# Patient Record
(demographics unavailable — no encounter records)

---

## 2025-01-13 NOTE — REASON FOR VISIT
[Follow - Up] : a follow-up visit [Weight Management/Obesity] : weight management/obesity [Parent] : parent

## 2025-01-13 NOTE — HISTORY OF PRESENT ILLNESS
[FreeTextEntry1] : 25 year old male patient who present for follow up evaluation of abnormal weight gain    - patient reports excessive weight gain in 2018 ( when mom was diagnosed with breast cancer ) and then was able to loose  and over the past one and half year he gained almost 100 lbs despite watching what he eats and following a low carb diet . he is not very active at work . but can go for walks . - he reports problems maintaining his sleep and also snore, likely has sleep apnea but did not get testing before  - occasional muscle aches, no bruising problems  - to note that patient was diagnosed as a child with autism , during visit with help of mother was interactive and answering all questions with no problems  - he denies taking any medications or supplements except zyrtec PRN   3/2023: had dex suppression test with adequate suppression  since last visit he lost weight feels ok , labs with A1c 5.7% glucose ok on CMP , pituitary work up ok  8/2023: since last visit , feels ok , remain on metformin 500 mg daily no SE , A1c 5.7%   -6/2024: patient present for follow up , he remain on metformin 1000 mg BID , tolerating it well, weight same A1c down  to 5.5%   1/2025: remain on metformin 1000 mg BID , A1c 5.8% , despite restricting diet and increase activity weight still up

## 2025-01-13 NOTE — REVIEW OF SYSTEMS
[Fatigue] : fatigue [As Noted in HPI] : as noted in HPI [Recent Weight Gain (___ Lbs)] : recent weight gain: [unfilled] lbs [Recent Weight Loss (___ Lbs)] : no recent weight loss [Blurred Vision] : no blurred vision [Dysphagia] : no dysphagia [Neck Pain] : no neck pain [Dysphonia] : no dysphonia [Chest Pain] : no chest pain [Palpitations] : no palpitations [Shortness Of Breath] : no shortness of breath [SOB on Exertion] : no shortness of breath on exertion [Nausea] : no nausea [Constipation] : no constipation [Vomiting] : no vomiting [Diarrhea] : no diarrhea [Muscle Weakness] : no muscle weakness [Acanthosis] : no acanthosis  [Acne] : no acne [Dry Skin] : no dry skin [Hair Loss] : no hair loss [Headaches] : no headaches [Tremors] : no tremors [Pain/Numbness of Digits] : no pain/numbness of digits [Cold Intolerance] : no cold intolerance [Heat Intolerance] : no heat intolerance

## 2025-01-13 NOTE — PHYSICAL EXAM
[Alert] : alert [Obese] : obese [No Acute Distress] : no acute distress [No Proptosis] : no proptosis [No Lid Lag] : no lid lag [Thyroid Not Enlarged] : the thyroid was not enlarged [No Thyroid Nodules] : no palpable thyroid nodules [No Respiratory Distress] : no respiratory distress [No Accessory Muscle Use] : no accessory muscle use [Clear to Auscultation] : lungs were clear to auscultation bilaterally [Normal S1, S2] : normal S1 and S2 [No Murmurs] : no murmurs [Regular Rhythm] : with a regular rhythm [No Edema] : no peripheral edema [Soft] : abdomen soft [Abdominal Striae] : abdominal striae present [No Tremors] : no tremors [Oriented x3] : oriented to person, place, and time [Acanthosis Nigricans] : no acanthosis nigricans [Acne] : no acne [de-identified] :  , abnormal stretch marks over abdomen side, supraclavicular fat pads

## 2025-01-13 NOTE — ASSESSMENT
[Importance of Diet and Exercise] : importance of diet and exercise to improve glycemic control, achieve weight loss and improve cardiovascular health [Exercise/Effect on Glucose] : exercise/effect on glucose [Weight Loss] : weight loss [FreeTextEntry1] : 25 year old male patient who present for follow up evaluation of abnormal weight gain/ preDM   # preDM / BMI 42 - patient reports excessive weight gain in 2018 ( when mom was diagnosed with breast cancer ) and then was able to loose  and over the past one and half year he gained almost 100 lbs despite watching what he eats and following a low carb diet . he is not very active at work . but can go for walks . - he reports problems maintaining his sleep and also snore, likely has sleep apnea but did not get testing before  - occasional muscle aches, no bruising problems  - to note that patient was diagnosed as a child with autism , during visit with help of mother was interactive and answering all questions with no problems  - he denies taking any medications or supplements  - tft's A1c and lipids  normal in 8/2022 , only high ALT ? fatty liver  # screen for [place] with dex suppression test was negative despite PE findings, will monitor and consider rechecking dex suppression or 24 hour urine cortisol if remain high suspicion  # preDM A1c 5.5%  on  metformin 1000 mg BID tolerating it well , continue  - 1/2025: A1c 5.8% despite metformin 1000 mg BID , gained weight , affecting his breathing, despite strict diet and also exercising, mom confirmed no FH of MEN syndrome , also having possible INOCENCIA  will try wegovy 0.25 mg Q week  and increase to 0.5 mg Q week and monitor , reviewed possible side effects and also injection technique

## 2025-07-14 NOTE — HISTORY OF PRESENT ILLNESS
[FreeTextEntry1] : 26 year old male patient who present for follow up evaluation of abnormal weight gain    - patient reports excessive weight gain in 2018 ( when mom was diagnosed with breast cancer ) and then was able to loose  and over the past one and half year he gained almost 100 lbs despite watching what he eats and following a low carb diet . he is not very active at work . but can go for walks . - he reports problems maintaining his sleep and also snore, likely has sleep apnea but did not get testing before  - occasional muscle aches, no bruising problems  - to note that patient was diagnosed as a child with autism , during visit with help of mother was interactive and answering all questions with no problems  - he denies taking any medications or supplements except zyrtec PRN   3/2023: had dex suppression test with adequate suppression  since last visit he lost weight feels ok , labs with A1c 5.7% glucose ok on CMP , pituitary work up ok  8/2023: since last visit , feels ok , remain on metformin 500 mg daily no SE , A1c 5.7%   -6/2024: patient present for follow up , he remain on metformin 1000 mg BID , tolerating it well, weight same A1c down  to 5.5%   - 1/2025: remain on metformin 1000 mg BID , A1c 5.8% , despite restricting diet and increase activity weight still up  - 7/2025 : off metformin , on wegovy just increased to 1 mg Q week feeling low appetite and some GI SE but tolerated so far

## 2025-07-14 NOTE — REVIEW OF SYSTEMS
[Fatigue] : fatigue [As Noted in HPI] : as noted in HPI [Recent Weight Gain (___ Lbs)] : no recent weight gain [Recent Weight Loss (___ Lbs)] : recent weight loss: [unfilled] lbs [Blurred Vision] : no blurred vision [Dysphagia] : no dysphagia [Neck Pain] : no neck pain [Dysphonia] : no dysphonia [Chest Pain] : no chest pain [Palpitations] : no palpitations [Shortness Of Breath] : no shortness of breath [SOB on Exertion] : no shortness of breath on exertion [Nausea] : no nausea [Constipation] : no constipation [Vomiting] : no vomiting [Diarrhea] : diarrhea [Muscle Weakness] : no muscle weakness [Acanthosis] : no acanthosis  [Acne] : no acne [Dry Skin] : no dry skin [Hair Loss] : no hair loss [Headaches] : no headaches [Tremors] : no tremors [Pain/Numbness of Digits] : no pain/numbness of digits [Cold Intolerance] : no cold intolerance [Heat Intolerance] : no heat intolerance

## 2025-07-14 NOTE — END OF VISIT
[Time Spent: ___ minutes] : I have spent [unfilled] minutes of time on the encounter which excludes teaching and separately reported services. right hand/complains of pain/discomfort

## 2025-07-14 NOTE — DATA REVIEWED
[FreeTextEntry1] : 8/2022: LDL 86  Tg 78 glucose 87 crea 0.91   AST 31  ALT 64  A1c 5.4% TSH 2.88  Ft4 1.5  hb 14.8  11/2022: dex suppression test : dexamethasone level 255  ACTH <1.5 am cortisol 0.3 IGF1 153    2/20/23:  A1c 5.7%   ACTH 41.6 prolactin 11 am cortisol 10.3  DHEAS 356  LDL 86  Tg 43   Na 142  K 4.5   8/2023: A1c 5.7% TSH 2.91  ft4 1.6 LDL 67  Tg 47    6/2024: A1c 5.5% TSH 2.49 ft4 1.5 LDL 81 Tg 7125 vit D 30  b12 356   1/2025: A1c 5.8% glucose 87 crea 0.81    AST 22  ALT 41  TSH 2.58  FT4 1.3  LDL 76  tg 47   7/2025: A1c 5.5% lipid panel ok CMp ok

## 2025-07-14 NOTE — REASON FOR VISIT
[Mother] : mother [Home] : at home, [unfilled] , at the time of the visit. [Medical Office: ([facility])___] : at the medical office located in  [Telephone (audio)] : This telephonic visit was provided via audio only technology. [No access to tele-video equipment] : patient lacks access to tele-video equipment. [Verbal consent obtained from patient] : the patient, [unfilled] [Follow - Up] : a follow-up visit [Weight Management/Obesity] : weight management/obesity [Parent] : parent

## 2025-07-14 NOTE — ASSESSMENT
[Importance of Diet and Exercise] : importance of diet and exercise to improve glycemic control, achieve weight loss and improve cardiovascular health [Exercise/Effect on Glucose] : exercise/effect on glucose [Weight Loss] : weight loss [FreeTextEntry1] : 26 year old male patient who present for follow up evaluation of abnormal weight gain/ preDM   # preDM / BMI 42 - patient reports excessive weight gain in 2018 ( when mom was diagnosed with breast cancer ) and then was able to loose  and over the past one and half year he gained almost 100 lbs despite watching what he eats and following a low carb diet . he is not very active at work . but can go for walks . - he reports problems maintaining his sleep and also snore, likely has sleep apnea but did not get testing before  - occasional muscle aches, no bruising problems  - to note that patient was diagnosed as a child with autism , during visit with help of mother was interactive and answering all questions with no problems  - he denies taking any medications or supplements  - tft's A1c and lipids  normal in 8/2022 , only high ALT ? fatty liver  # screen for [place] with dex suppression test was negative despite PE findings, will monitor and consider rechecking dex suppression or 24 hour urine cortisol if remain high suspicion  # preDM A1c 5.5%  on  metformin 1000 mg BID tolerating it well , continue  - 1/2025: A1c 5.8% despite metformin 1000 mg BID , gained weight , affecting his breathing, despite strict diet and also exercising, mom confirmed no FH of MEN syndrome , also having possible INOCENCIA  - 7/2025 : off metformin , on wegovy just increased to 1 mg Q week feeling low appetite and some GI SE but tolerated so far  he will continue for now as side effects tolerated , if not will have to lower back to 0.5 mg and monitor